# Patient Record
Sex: FEMALE | Race: WHITE
[De-identification: names, ages, dates, MRNs, and addresses within clinical notes are randomized per-mention and may not be internally consistent; named-entity substitution may affect disease eponyms.]

---

## 2023-08-09 ENCOUNTER — HOSPITAL ENCOUNTER (EMERGENCY)
Dept: HOSPITAL 97 - ER | Age: 33
Discharge: HOME | End: 2023-08-09
Payer: COMMERCIAL

## 2023-08-09 VITALS — OXYGEN SATURATION: 100 % | DIASTOLIC BLOOD PRESSURE: 80 MMHG | TEMPERATURE: 98 F | SYSTOLIC BLOOD PRESSURE: 135 MMHG

## 2023-08-09 DIAGNOSIS — H54.61: Primary | ICD-10-CM

## 2023-08-09 NOTE — EDPHYS
Physician Documentation                                                                           

 [facility]                                                                 

Name: Octavia Sanchez                                                                               

Age: 33 yrs                                                                                       

Sex: Female                                                                                       

: 1990                                                                                   

MRN: [id number]                                                                                   

Arrival Date: 2023                                                                          

Time: 10:05                                                                                       

Account#: [id number]                                                                            

Bed 3                                                                                             

Private MD:                                                                                       

ED Physician Kami Drake                                                                         

HPI:                                                                                              

                                                                                             

10:22 This 33 yrs old Female presents to ER via Unassigned with complaints of Loss Of Vision  sp3 

      - Right Eye.                                                                                

10:22 33-year-old female with no significant past medical history who presents from local     3 

      rehab facility for chief complaint right sided eye vision changes. She states that          

      approximately 3 days ago, she started having tunnel vision and darkened area in her         

      visual field on the right eye on the right side. Left eye is not affected whatsoever        

      and she denies any double vision. No prior history of retinal injury. She states that       

      her symptoms spontaneously self resolved and are worse in the morning. She denies           

      headache, other neurological abnormality, or blurry vision. Currently her symptoms are      

      almost fully resolved in the ED. She does not have a local eye care professional she is     

      not from the local area. On review of systems, she denies current headache, facial          

      pain, neck pain, fever, URI symptoms, chest pain, shortness of breath, back pain,           

      abdominal pain, nausea, vomiting, diarrhea, other neurological deficits including           

      numbness tingling or weakness, syncope, near syncope, travel history, or any other          

      signs or symptoms at this time..                                                            

                                                                                                  

Historical:                                                                                       

- Allergies:                                                                                      

10:24 No Known Allergies;                                                                     ss  

                                                                                                  

                                                                                                  

                                                                                                  

ROS:                                                                                              

10:26 Constitutional: Negative for fever, chills, and weight loss, ENT: Negative for injury,  sp3 

      pain, and discharge, Neck: Negative for injury, pain, and swelling, Cardiovascular:         

      Negative for chest pain, palpitations, and edema, Respiratory: Negative for shortness       

      of breath, cough, wheezing, and pleuritic chest pain, Abdomen/GI: Negative for              

      abdominal pain, nausea, vomiting, diarrhea, and constipation, Back: Negative for injury     

      and pain, : Negative for injury, bleeding, discharge, and swelling, MS/Extremity:         

      Negative for injury and deformity, Skin: Negative for injury, rash, and discoloration,      

      Neuro: Negative for headache, weakness, numbness, tingling, and seizure, Psych:             

      Negative for depression, anxiety, suicide ideation, homicidal ideation, and                 

      hallucinations, Allergy/Immunology: Negative for hives, rash, and allergies, Endocrine:     

      Negative for neck swelling, polydipsia, polyuria, polyphagia, and marked weight             

      changes, Hematologic/Lymphatic: Negative for swollen nodes, abnormal bleeding, and          

      unusual bruising.                                                                           

10:26 All other systems are negative.                                                             

                                                                                                  

Exam:                                                                                             

10:26 Constitutional:  This is a well developed, well nourished patient who is awake, alert,  sp3 

      and in no acute distress. Head/Face:  Normocephalic, atraumatic. Eyes:  Pupils equal        

      round and reactive to light, extra-ocular motions intact.  Lids and lashes normal.          

      Conjunctiva and sclera are non-icteric and not injected.  Cornea within normal limits.      

      Periorbital areas with no swelling, redness, or edema. ENT:  Nares patent. No nasal         

      discharge, no septal abnormalities noted.  External auditory canals are clear.              

      Oropharynx with no redness, swelling, or masses, exudates, or evidence of obstruction,      

      uvula midline.  Mucous membranes moist. Neck:  Trachea midline, no thyromegaly or           

      masses palpated, and no cervical lymphadenopathy.  Supple, full range of motion without     

      nuchal rigidity, or vertebral point tenderness.  No Meningismus. Chest/axilla:  Normal      

      chest wall appearance and motion.  Nontender with no deformity.  No lesions are             

      appreciated. Cardiovascular:  Regular rate and rhythm with a normal S1 and S2.  No          

      gallops, murmurs, or rubs.  Normal PMI, no JVD.  No pulse deficits. Respiratory:  Lungs     

      have equal breath sounds bilaterally, clear to auscultation and percussion.  No rales,      

      rhonchi or wheezes noted.  No increased work of breathing, no retractions or nasal          

      flaring. Abdomen/GI:  Soft, non-tender, with normal bowel sounds.  No distension or         

      tympany.  No guarding or rebound.  No evidence of tenderness throughout. Neuro:  Awake      

      and alert, GCS 15, oriented to person, place, time, and situation.  Cranial nerves          

      II-XII grossly intact.  Motor strength 5/5 in all extremities.  Sensory grossly intact.     

       Cerebellar exam normal.  Normal gait. Psych:  Awake, alert, with orientation to            

      person, place and time.  Behavior, mood, and affect are within normal limits.               

                                                                                                  

Vital Signs:                                                                                      

10:22  / 80; Pulse 65; Resp 16; Temp 98(TE); Pulse Ox 100% on R/A; Weight 73.94 kg;     ss  

      Height 6 ft. 0 in. ; Pain 0/10;                                                             

10:22 Body Mass Index 22.11 (73.94 kg, 182.88 cm)                                             ss  

10:22 Pain Scale: Adult                                                                       ss  

                                                                                                  

MDM:                                                                                              

10:21 Patient medically screened.                                                             sp3 

10:26 Data reviewed: vital signs, nurses notes. ED course: 33-year-old female with no         sp3 

      significant past medical history presents with a resolved right sided right                 

      hemianopsia. Symptoms have been coming and going for 3 days now. Retinal exam is            

      normal. Anterior chamber exam demonstrates no hyphema or other abnormality. I am not        

      suspicious for acute angle glaucoma. Visual acuity is normal. All visual fields             

      currently are grossly intact. There is no left-sided symptoms whatsoever. Given the         

      limitations of our equipment here, I have advised her to follow-up with ophthalmology       

      and/or optometry today if possible. She plans to going to a walk-in appointment at a        

      local eye care professional. If her symptoms get worse with the return she can come         

      back here for further reassessment. CT scan is not indicated at this time as she has        

      resolved symptoms and no headache. We can potentially get that if her symptoms return.      

      I believe for she needs to get a proper fully dilated eye exam..                            

                                                                                                  

Administered Medications:                                                                         

No medications were administered                                                                  

                                                                                                  

                                                                                                  

Disposition Summary:                                                                              

23 10:29                                                                                    

Discharge Ordered                                                                                 

      Location: Home                                                                          sp3 

      Condition: Stable                                                                       sp3 

      Diagnosis                                                                                   

        - Right-sided visual deficit, resolved                                                sp3 

      Followup:                                                                               sp3 

        - With: Rola Krause MD                                                                 

        - When: Upon discharge from the Emergency Department                                       

        - Reason: Further diagnostic work-up                                                       

      Discharge Instructions:                                                                     

        - Discharge Summary Sheet                                                             sp3 

        - Visual Disturbances                                                                 sp3 

      Forms:                                                                                      

        - Medication Reconciliation Form                                                      sp3 

        - Thank You Letter                                                                    sp3 

        - Antibiotic Education                                                                sp3 

        - Prescription Opioid Use                                                             sp3 

        - Patient Portal Instructions                                                         sp3 

Signatures:                                                                                       

Victorina Estrada RN                   RN   ss                                                   

Kami Drake MD MD   sp3                                                  

                                                                                                  

**************************************************************************************************

## 2023-08-09 NOTE — ER
Nurse's Notes                                                                                     

 [facility]                                                                 

Name: Octavia Sanchez                                                                               

Age: 33 yrs                                                                                       

Sex: Female                                                                                       

: 1990                                                                                   

MRN: [id number]                                                                                   

Arrival Date: 2023                                                                          

Time: 10:05                                                                                       

Account#: [id number]                                                                            

Bed 3                                                                                             

Private MD:                                                                                       

Diagnosis: Right-sided visual deficit, resolved                                                   

                                                                                                  

Presentation:                                                                                     

                                                                                             

10:22 Chief complaint: Patient states: "For the past 3-4 days when I wake up I see black in   ss  

      my R eye." Pt reports it comes and goes, seems to be worse in the mornings. Currently a     

      resident as [facility] rehab and states that the physician there recommended she come     

      to ER for further evaluation. Coronavirus screen: Client denies travel out of the [place]      

      in the last 14 days. Ebola Screen: Patient denies exposure to infectious person.            

      Patient denies travel to an Ebola-affected area in the 21 days before illness onset.        

      Initial Sepsis Screen: Does the patient meet any 2 criteria? Yes Does the patient have      

      a suspected source of infection? No. Patient's initial sepsis screen is negative. Risk      

      Assessment: Do you want to hurt yourself or someone else? Patient reports no desire to      

      harm self or others. Onset of symptoms was 2023.                                 

10:22 Method Of Arrival: Ambulatory                                                           ss  

10:22 Acuity: RADHA 2                                                                           ss  

                                                                                                  

Historical:                                                                                       

- Allergies:                                                                                      

10:24 No Known Allergies;                                                                     ss  

                                                                                                  

                                                                                                  

                                                                                                  

Screening:                                                                                        

10:24 [facility] ED Fall Risk Assessment (Adult) History of falling in the last 3 months,       ss  

      including since admission No falls in past 3 months (0 pts). Abuse screen: Denies           

      threats or abuse. Denies injuries from another. Nutritional screening: No deficits          

      noted.                                                                                      

                                                                                                  

Assessment:                                                                                       

10:24 General: Appears in no apparent distress. comfortable, Behavior is calm, cooperative.   ss  

      Pain: Denies pain. Neuro: Level of Consciousness is awake, alert, obeys commands,           

      Oriented to person, place, time, situation,  are weak bilaterally Moves all            

      extremities. Full function Gait is steady, Speech is normal, Facial droop on right,         

      Pupils are PERRLA, Intact. Cardiovascular: Capillary refill < 3 seconds is brisk in         

      bilateral fingers. Respiratory: Airway is patent Respiratory effort is even, unlabored,     

      Respiratory pattern is regular, symmetrical. GI: Patient currently denies nausea. EENT:     

      Reports Intermittent loss of partial vision in R eye x 3-4 days. Pt reports it appears      

      black, which is worse in the morning. Derm: Skin is intact, is healthy with good            

      turgor, Skin is pink, warm \T\ dry. normal.                                                 

                                                                                                  

Vital Signs:                                                                                      

10:22  / 80; Pulse 65; Resp 16; Temp 98(TE); Pulse Ox 100% on R/A; Weight 73.94 kg;     ss  

      Height 6 ft. 0 in. ; Pain 0/10;                                                             

10:22 Body Mass Index 22.11 (73.94 kg, 182.88 cm)                                             ss  

10:22 Pain Scale: Adult                                                                       ss  

                                                                                                  

ED Course:                                                                                        

10:08 Patient arrived in ED.                                                                  mg5 

10:13 Kami Drake MD is Attending Physician.                                                sp3 

10:22 Victorina Estrada RN is Primary Nurse.                                                 ss  

10:24 Triage completed.                                                                       ss  

10:24 Arm band placed on right wrist.                                                         ss  

10:24 Patient has correct armband on for positive identification.                             ss  

10:24 No provider procedures requiring assistance completed. Patient did not have IV access   ss  

      during this emergency room visit.                                                           

10:28 Rola Krause MD is Referral Physician.                                               sp3 

                                                                                                  

Administered Medications:                                                                         

No medications were administered                                                                  

                                                                                                  

                                                                                                  

Medication:                                                                                       

10:24 VIS not applicable for this client.                                                     ss  

                                                                                                  

Outcome:                                                                                          

10:29 Discharge ordered by MD.                                                                sp3 

10:38 Discharged to home ambulatory.                                                          ss  

10:38 Condition: good                                                                             

10:38 Discharge instructions given to patient, Instructed on discharge instructions, follow       

      up and referral plans. Demonstrated understanding of instructions, follow-up care.          

10:39 Patient left the ED.                                                                    ss  

                                                                                                  

Signatures:                                                                                       

Victorina Estrada RN                   RN                                                      

Kami Drake MD MD   sp3                                                  

Priscilla Alcaraz                             mg5                                                  

                                                                                                  

**************************************************************************************************